# Patient Record
Sex: FEMALE | Race: WHITE | Employment: OTHER | URBAN - METROPOLITAN AREA
[De-identification: names, ages, dates, MRNs, and addresses within clinical notes are randomized per-mention and may not be internally consistent; named-entity substitution may affect disease eponyms.]

---

## 2019-03-08 PROBLEM — N18.30 CKD (CHRONIC KIDNEY DISEASE) STAGE 3, GFR 30-59 ML/MIN (HCC): Status: ACTIVE | Noted: 2019-03-08

## 2019-09-13 PROBLEM — N18.30 CKD (CHRONIC KIDNEY DISEASE) STAGE 3, GFR 30-59 ML/MIN (HCC): Status: RESOLVED | Noted: 2019-03-08 | Resolved: 2019-09-13

## 2019-09-13 PROBLEM — N18.4 CKD (CHRONIC KIDNEY DISEASE) STAGE 4, GFR 15-29 ML/MIN (HCC): Status: ACTIVE | Noted: 2019-09-13

## 2021-01-08 PROBLEM — D69.6 THROMBOCYTOPENIA (HCC): Status: ACTIVE | Noted: 2021-01-08

## 2023-04-13 RX ORDER — SIMVASTATIN 20 MG
20 TABLET ORAL NIGHTLY
COMMUNITY

## 2023-04-13 RX ORDER — SENNA PLUS 8.6 MG/1
2 TABLET ORAL NIGHTLY
COMMUNITY

## 2023-04-13 RX ORDER — ACETAMINOPHEN 500 MG
1000 TABLET ORAL
COMMUNITY

## 2023-04-28 ENCOUNTER — ANESTHESIA EVENT (OUTPATIENT)
Dept: SURGERY | Facility: HOSPITAL | Age: 83
End: 2023-04-28
Payer: MEDICARE

## 2023-04-28 ENCOUNTER — ANESTHESIA (OUTPATIENT)
Dept: SURGERY | Facility: HOSPITAL | Age: 83
End: 2023-04-28
Payer: MEDICARE

## 2023-04-28 ENCOUNTER — HOSPITAL ENCOUNTER (OUTPATIENT)
Facility: HOSPITAL | Age: 83
Discharge: NURSING, REHAB OR LONG-TERM CARE | End: 2023-05-01
Attending: ORTHOPAEDIC SURGERY | Admitting: ORTHOPAEDIC SURGERY
Payer: MEDICARE

## 2023-04-28 ENCOUNTER — APPOINTMENT (OUTPATIENT)
Dept: GENERAL RADIOLOGY | Facility: HOSPITAL | Age: 83
End: 2023-04-28
Attending: ORTHOPAEDIC SURGERY
Payer: MEDICARE

## 2023-04-28 PROCEDURE — 0QPG04Z REMOVAL OF INTERNAL FIXATION DEVICE FROM RIGHT TIBIA, OPEN APPROACH: ICD-10-PCS | Performed by: ORTHOPAEDIC SURGERY

## 2023-04-28 PROCEDURE — 0SGC0KZ FUSION OF RIGHT KNEE JOINT WITH NONAUTOLOGOUS TISSUE SUBSTITUTE, OPEN APPROACH: ICD-10-PCS | Performed by: ORTHOPAEDIC SURGERY

## 2023-04-28 PROCEDURE — 76000 FLUOROSCOPY <1 HR PHYS/QHP: CPT | Performed by: ORTHOPAEDIC SURGERY

## 2023-04-28 PROCEDURE — 0L8S3ZZ DIVISION OF RIGHT ANKLE TENDON, PERCUTANEOUS APPROACH: ICD-10-PCS | Performed by: ORTHOPAEDIC SURGERY

## 2023-04-28 PROCEDURE — 0SSF04Z REPOSITION RIGHT ANKLE JOINT WITH INTERNAL FIXATION DEVICE, OPEN APPROACH: ICD-10-PCS | Performed by: ORTHOPAEDIC SURGERY

## 2023-04-28 PROCEDURE — 0QPJ04Z REMOVAL OF INTERNAL FIXATION DEVICE FROM RIGHT FIBULA, OPEN APPROACH: ICD-10-PCS | Performed by: ORTHOPAEDIC SURGERY

## 2023-04-28 PROCEDURE — 76942 ECHO GUIDE FOR BIOPSY: CPT | Performed by: STUDENT IN AN ORGANIZED HEALTH CARE EDUCATION/TRAINING PROGRAM

## 2023-04-28 DEVICE — DEMINERALIZED BONE MATRIX (DBM) IN A LIPID CARRIER
Type: IMPLANTABLE DEVICE | Site: ANKLE | Status: FUNCTIONAL
Brand: STAGRAFT DBM PUTTY

## 2023-04-28 DEVICE — PLATE LCP TUB 1/3 5H 241.351: Type: IMPLANTABLE DEVICE | Site: ANKLE | Status: FUNCTIONAL

## 2023-04-28 DEVICE — IMPLANTABLE DEVICE: Type: IMPLANTABLE DEVICE | Site: ANKLE | Status: FUNCTIONAL

## 2023-04-28 DEVICE — SCREW BN 4MM 6MM 50MM LCP SS: Type: IMPLANTABLE DEVICE | Site: ANKLE | Status: FUNCTIONAL

## 2023-04-28 DEVICE — CANCELLOUS CHIPS 1-4MM 15CC: Type: IMPLANTABLE DEVICE | Site: ANKLE | Status: FUNCTIONAL

## 2023-04-28 RX ORDER — LISINOPRIL 20 MG/1
20 TABLET ORAL 2 TIMES DAILY
COMMUNITY

## 2023-04-28 RX ORDER — LABETALOL HYDROCHLORIDE 5 MG/ML
5 INJECTION, SOLUTION INTRAVENOUS EVERY 5 MIN PRN
Status: DISCONTINUED | OUTPATIENT
Start: 2023-04-28 | End: 2023-04-28 | Stop reason: HOSPADM

## 2023-04-28 RX ORDER — SODIUM CHLORIDE, SODIUM LACTATE, POTASSIUM CHLORIDE, CALCIUM CHLORIDE 600; 310; 30; 20 MG/100ML; MG/100ML; MG/100ML; MG/100ML
INJECTION, SOLUTION INTRAVENOUS CONTINUOUS
Status: DISCONTINUED | OUTPATIENT
Start: 2023-04-28 | End: 2023-04-28 | Stop reason: HOSPADM

## 2023-04-28 RX ORDER — CETIRIZINE HYDROCHLORIDE 5 MG/1
5 TABLET ORAL EVERY EVENING
Status: DISCONTINUED | OUTPATIENT
Start: 2023-04-28 | End: 2023-05-01

## 2023-04-28 RX ORDER — GLYCOPYRROLATE 0.2 MG/ML
INJECTION, SOLUTION INTRAMUSCULAR; INTRAVENOUS AS NEEDED
Status: DISCONTINUED | OUTPATIENT
Start: 2023-04-28 | End: 2023-04-28 | Stop reason: SURG

## 2023-04-28 RX ORDER — PHENYLEPHRINE HCL 10 MG/ML
VIAL (ML) INJECTION AS NEEDED
Status: DISCONTINUED | OUTPATIENT
Start: 2023-04-28 | End: 2023-04-28 | Stop reason: SURG

## 2023-04-28 RX ORDER — ENOXAPARIN SODIUM 100 MG/ML
30 INJECTION SUBCUTANEOUS NIGHTLY
Status: DISCONTINUED | OUTPATIENT
Start: 2023-04-28 | End: 2023-05-01

## 2023-04-28 RX ORDER — BISACODYL 10 MG
10 SUPPOSITORY, RECTAL RECTAL
Status: DISCONTINUED | OUTPATIENT
Start: 2023-04-28 | End: 2023-05-01

## 2023-04-28 RX ORDER — HYDROMORPHONE HYDROCHLORIDE 1 MG/ML
0.4 INJECTION, SOLUTION INTRAMUSCULAR; INTRAVENOUS; SUBCUTANEOUS EVERY 2 HOUR PRN
Status: DISCONTINUED | OUTPATIENT
Start: 2023-04-28 | End: 2023-05-01

## 2023-04-28 RX ORDER — HYDROCODONE BITARTRATE AND ACETAMINOPHEN 5; 325 MG/1; MG/1
1 TABLET ORAL ONCE AS NEEDED
Status: DISCONTINUED | OUTPATIENT
Start: 2023-04-28 | End: 2023-04-28 | Stop reason: HOSPADM

## 2023-04-28 RX ORDER — ACETAMINOPHEN 500 MG
1000 TABLET ORAL ONCE AS NEEDED
Status: DISCONTINUED | OUTPATIENT
Start: 2023-04-28 | End: 2023-04-28 | Stop reason: HOSPADM

## 2023-04-28 RX ORDER — METOCLOPRAMIDE HYDROCHLORIDE 5 MG/ML
10 INJECTION INTRAMUSCULAR; INTRAVENOUS EVERY 8 HOURS PRN
Status: DISCONTINUED | OUTPATIENT
Start: 2023-04-28 | End: 2023-05-01

## 2023-04-28 RX ORDER — ONDANSETRON 2 MG/ML
4 INJECTION INTRAMUSCULAR; INTRAVENOUS EVERY 6 HOURS PRN
Status: DISCONTINUED | OUTPATIENT
Start: 2023-04-28 | End: 2023-05-01

## 2023-04-28 RX ORDER — FAMOTIDINE 20 MG/1
20 TABLET, FILM COATED ORAL NIGHTLY
Status: DISCONTINUED | OUTPATIENT
Start: 2023-04-28 | End: 2023-05-01

## 2023-04-28 RX ORDER — FAMOTIDINE 20 MG/1
20 TABLET, FILM COATED ORAL EVERY 24 HOURS
Status: DISCONTINUED | OUTPATIENT
Start: 2023-04-28 | End: 2023-04-28

## 2023-04-28 RX ORDER — ROPIVACAINE HYDROCHLORIDE 5 MG/ML
INJECTION, SOLUTION EPIDURAL; INFILTRATION; PERINEURAL AS NEEDED
Status: DISCONTINUED | OUTPATIENT
Start: 2023-04-28 | End: 2023-04-28 | Stop reason: SURG

## 2023-04-28 RX ORDER — ALLOPURINOL 100 MG/1
100 TABLET ORAL DAILY
COMMUNITY

## 2023-04-28 RX ORDER — HYDROMORPHONE HYDROCHLORIDE 1 MG/ML
0.2 INJECTION, SOLUTION INTRAMUSCULAR; INTRAVENOUS; SUBCUTANEOUS EVERY 5 MIN PRN
Status: DISCONTINUED | OUTPATIENT
Start: 2023-04-28 | End: 2023-04-28 | Stop reason: HOSPADM

## 2023-04-28 RX ORDER — LIDOCAINE HYDROCHLORIDE 10 MG/ML
INJECTION, SOLUTION EPIDURAL; INFILTRATION; INTRACAUDAL; PERINEURAL AS NEEDED
Status: DISCONTINUED | OUTPATIENT
Start: 2023-04-28 | End: 2023-04-28 | Stop reason: SURG

## 2023-04-28 RX ORDER — ATORVASTATIN CALCIUM 10 MG/1
10 TABLET, FILM COATED ORAL NIGHTLY
Status: DISCONTINUED | OUTPATIENT
Start: 2023-04-28 | End: 2023-05-01

## 2023-04-28 RX ORDER — HYDROMORPHONE HYDROCHLORIDE 1 MG/ML
0.4 INJECTION, SOLUTION INTRAMUSCULAR; INTRAVENOUS; SUBCUTANEOUS EVERY 5 MIN PRN
Status: DISCONTINUED | OUTPATIENT
Start: 2023-04-28 | End: 2023-04-28 | Stop reason: HOSPADM

## 2023-04-28 RX ORDER — OXYCODONE HYDROCHLORIDE 5 MG/1
2.5 TABLET ORAL EVERY 4 HOURS PRN
Status: DISCONTINUED | OUTPATIENT
Start: 2023-04-28 | End: 2023-05-01

## 2023-04-28 RX ORDER — LISINOPRIL 20 MG/1
20 TABLET ORAL 2 TIMES DAILY
Status: DISCONTINUED | OUTPATIENT
Start: 2023-04-28 | End: 2023-05-01

## 2023-04-28 RX ORDER — OXYCODONE HYDROCHLORIDE 5 MG/1
5 TABLET ORAL EVERY 4 HOURS PRN
Status: DISCONTINUED | OUTPATIENT
Start: 2023-04-28 | End: 2023-05-01

## 2023-04-28 RX ORDER — SODIUM CHLORIDE, SODIUM LACTATE, POTASSIUM CHLORIDE, CALCIUM CHLORIDE 600; 310; 30; 20 MG/100ML; MG/100ML; MG/100ML; MG/100ML
INJECTION, SOLUTION INTRAVENOUS CONTINUOUS
Status: DISCONTINUED | OUTPATIENT
Start: 2023-04-28 | End: 2023-05-01

## 2023-04-28 RX ORDER — FEXOFENADINE HYDROCHLORIDE 60 MG/1
60 TABLET, FILM COATED ORAL DAILY PRN
COMMUNITY
Start: 2023-02-24

## 2023-04-28 RX ORDER — ALLOPURINOL 100 MG/1
100 TABLET ORAL DAILY
Status: DISCONTINUED | OUTPATIENT
Start: 2023-04-29 | End: 2023-05-01

## 2023-04-28 RX ORDER — DOCUSATE SODIUM 100 MG/1
100 CAPSULE, LIQUID FILLED ORAL 2 TIMES DAILY
Status: DISCONTINUED | OUTPATIENT
Start: 2023-04-28 | End: 2023-05-01

## 2023-04-28 RX ORDER — HYDROMORPHONE HYDROCHLORIDE 1 MG/ML
0.2 INJECTION, SOLUTION INTRAMUSCULAR; INTRAVENOUS; SUBCUTANEOUS EVERY 2 HOUR PRN
Status: DISCONTINUED | OUTPATIENT
Start: 2023-04-28 | End: 2023-05-01

## 2023-04-28 RX ORDER — ACETAMINOPHEN 500 MG
1000 TABLET ORAL ONCE
Status: DISCONTINUED | OUTPATIENT
Start: 2023-04-28 | End: 2023-04-28 | Stop reason: HOSPADM

## 2023-04-28 RX ORDER — POLYETHYLENE GLYCOL 3350 17 G/17G
17 POWDER, FOR SOLUTION ORAL DAILY PRN
Status: DISCONTINUED | OUTPATIENT
Start: 2023-04-28 | End: 2023-05-01

## 2023-04-28 RX ORDER — DEXAMETHASONE SODIUM PHOSPHATE 4 MG/ML
VIAL (ML) INJECTION AS NEEDED
Status: DISCONTINUED | OUTPATIENT
Start: 2023-04-28 | End: 2023-04-28 | Stop reason: SURG

## 2023-04-28 RX ORDER — NALOXONE HYDROCHLORIDE 0.4 MG/ML
80 INJECTION, SOLUTION INTRAMUSCULAR; INTRAVENOUS; SUBCUTANEOUS AS NEEDED
Status: DISCONTINUED | OUTPATIENT
Start: 2023-04-28 | End: 2023-04-28 | Stop reason: HOSPADM

## 2023-04-28 RX ORDER — FAMOTIDINE 10 MG/ML
20 INJECTION, SOLUTION INTRAVENOUS EVERY 24 HOURS
Status: DISCONTINUED | OUTPATIENT
Start: 2023-04-28 | End: 2023-04-28

## 2023-04-28 RX ORDER — SODIUM PHOSPHATE, DIBASIC AND SODIUM PHOSPHATE, MONOBASIC 7; 19 G/133ML; G/133ML
1 ENEMA RECTAL ONCE AS NEEDED
Status: DISCONTINUED | OUTPATIENT
Start: 2023-04-28 | End: 2023-05-01

## 2023-04-28 RX ORDER — LABETALOL HYDROCHLORIDE 5 MG/ML
INJECTION, SOLUTION INTRAVENOUS AS NEEDED
Status: DISCONTINUED | OUTPATIENT
Start: 2023-04-28 | End: 2023-04-28 | Stop reason: SURG

## 2023-04-28 RX ORDER — FAMOTIDINE 20 MG/1
20 TABLET, FILM COATED ORAL NIGHTLY
COMMUNITY

## 2023-04-28 RX ORDER — HYDROMORPHONE HYDROCHLORIDE 1 MG/ML
0.6 INJECTION, SOLUTION INTRAMUSCULAR; INTRAVENOUS; SUBCUTANEOUS EVERY 5 MIN PRN
Status: DISCONTINUED | OUTPATIENT
Start: 2023-04-28 | End: 2023-04-28 | Stop reason: HOSPADM

## 2023-04-28 RX ORDER — HYDROCODONE BITARTRATE AND ACETAMINOPHEN 5; 325 MG/1; MG/1
2 TABLET ORAL ONCE AS NEEDED
Status: DISCONTINUED | OUTPATIENT
Start: 2023-04-28 | End: 2023-04-28 | Stop reason: HOSPADM

## 2023-04-28 RX ORDER — ATENOLOL 25 MG/1
25 TABLET ORAL 2 TIMES DAILY
COMMUNITY

## 2023-04-28 RX ORDER — ONDANSETRON 2 MG/ML
INJECTION INTRAMUSCULAR; INTRAVENOUS AS NEEDED
Status: DISCONTINUED | OUTPATIENT
Start: 2023-04-28 | End: 2023-04-28 | Stop reason: SURG

## 2023-04-28 RX ADMIN — SODIUM CHLORIDE, SODIUM LACTATE, POTASSIUM CHLORIDE, CALCIUM CHLORIDE: 600; 310; 30; 20 INJECTION, SOLUTION INTRAVENOUS at 14:32:00

## 2023-04-28 RX ADMIN — LIDOCAINE HYDROCHLORIDE 50 MG: 10 INJECTION, SOLUTION EPIDURAL; INFILTRATION; INTRACAUDAL; PERINEURAL at 11:50:00

## 2023-04-28 RX ADMIN — LABETALOL HYDROCHLORIDE 5 MG: 5 INJECTION, SOLUTION INTRAVENOUS at 13:40:00

## 2023-04-28 RX ADMIN — LABETALOL HYDROCHLORIDE 5 MG: 5 INJECTION, SOLUTION INTRAVENOUS at 13:50:00

## 2023-04-28 RX ADMIN — ROPIVACAINE HYDROCHLORIDE 25 ML: 5 INJECTION, SOLUTION EPIDURAL; INFILTRATION; PERINEURAL at 11:55:00

## 2023-04-28 RX ADMIN — LABETALOL HYDROCHLORIDE 5 MG: 5 INJECTION, SOLUTION INTRAVENOUS at 14:00:00

## 2023-04-28 RX ADMIN — PHENYLEPHRINE HCL 100 MCG: 10 MG/ML VIAL (ML) INJECTION at 11:50:00

## 2023-04-28 RX ADMIN — GLYCOPYRROLATE 0.2 MG: 0.2 INJECTION, SOLUTION INTRAMUSCULAR; INTRAVENOUS at 12:45:00

## 2023-04-28 RX ADMIN — ONDANSETRON 4 MG: 2 INJECTION INTRAMUSCULAR; INTRAVENOUS at 13:45:00

## 2023-04-28 RX ADMIN — LABETALOL HYDROCHLORIDE 5 MG: 5 INJECTION, SOLUTION INTRAVENOUS at 13:30:00

## 2023-04-28 RX ADMIN — DEXAMETHASONE SODIUM PHOSPHATE 4 MG: 4 MG/ML VIAL (ML) INJECTION at 11:50:00

## 2023-04-28 RX ADMIN — LABETALOL HYDROCHLORIDE 5 MG: 5 INJECTION, SOLUTION INTRAVENOUS at 13:35:00

## 2023-04-28 RX ADMIN — SODIUM CHLORIDE, SODIUM LACTATE, POTASSIUM CHLORIDE, CALCIUM CHLORIDE: 600; 310; 30; 20 INJECTION, SOLUTION INTRAVENOUS at 11:40:00

## 2023-04-28 NOTE — ANESTHESIA PROCEDURE NOTES
Airway  Date/Time: 4/28/2023 11:51 AM  Urgency: elective    Airway not difficult    General Information and Staff    Patient location during procedure: OR  Anesthesiologist: Narcisa Walsh MD  Performed: anesthesiologist   Performed by: Narcisa Walsh MD  Authorized by:  Narcisa Walsh MD      Indications and Patient Condition  Indications for airway management: anesthesia  Sedation level: deep  Preoxygenated: yes  Patient position: sniffing  Mask difficulty assessment: 0 - not attempted    Final Airway Details  Final airway type: supraglottic airway      Successful airway: classic  Size 3       Number of attempts at approach: 1  Number of other approaches attempted: 0

## 2023-04-28 NOTE — PROGRESS NOTES
[facility] Pharmacy Note:  Renal Dose Adjustment for Cetirizine (ZYRTEC)    Yessy Pepper has been prescribed Cetirizine (Zyrtec) 10 mg orally daily. CrCl cannot be calculated (Patient's most recent lab result is older than the maximum 7 days allowed. ). SCr from 4/18/23 = 1.61. Estmated ClCr = 29 mL/min. The ClCr <32 so the dose has been changed to 5 mg orally daily per P&T approved protocol. Pharmacy will follow and resume original order if renal function improves.       Thank you,  [address]  4/28/2023  5:25 PM  [address] [facility]: [phone]

## 2023-04-28 NOTE — BRIEF OP NOTE
Pre-Operative Diagnosis: CLOSED RIGHT TRIMALLEOLAR FRACTURE, WITH MALUNION, SUBSEQUENT ENCOUNTER; ANKLE SYNDESMOSIS DISRUPTION, RIGHT, SEQUELA; CLOSED DISCLOCATION OF RIGHT ANKLE, SEQUELA     Post-Operative Diagnosis: CLOSED RIGHT TRIMALLEOLAR FRACTURE, WITH MALUNION, SUBSEQUENT ENCOUNTER; ANKLE SYNDESMOSIS DISRUPTION, RIGHT, SEQUELA; CLOSED DISCLOCATION OF RIGHT ANKLE, SEQUELA      Procedure Performed:   RIGHT OPEN REPAIR ANKLE DISLOCATION, TIBIOFIBULAR ARTHRODESIS WITH ALLOGRAFT, REMOVAL OF HARDWARE AND RIGHT ACHILLES TENDON LENGTHENING    Surgeon(s) and Role:     * Dina Reese MD - Primary    Assistant(s):  PA: Isidra Caballero PA-C     Surgical Findings: Ankle dislocation Syndesmosis instability, Achilles contracture     Specimen: NONE     Estimated Blood Loss: Blood Output: 50 mL (4/28/2023  1:57 PM)      Dictation Number:  TBD    Abner Ramos MD  4/28/2023  2:19 PM

## 2023-04-28 NOTE — INTERVAL H&P NOTE
Pre-op Diagnosis: CLOSED RIGHT TRIMALLEOLAR FRACTURE, WITH MALUNION, SUBSEQUENT ENCOUNTER; ANKLE SYNDESMOSIS DISRUPTION, RIGHT, SEQUELA; CLOSED DISCLOCATION OF RIGHT ANKLE, SEQUELA    The above referenced H&P was reviewed by Marsha Gonzalez MD on 4/28/2023, the patient was examined and no significant changes have occurred in the patient's condition since the H&P was performed. I discussed with the patient and/or legal representative the potential benefits, risks and side effects of this procedure; the likelihood of the patient achieving goals; and potential problems that might occur during recuperation. I discussed reasonable alternatives to the procedure, including risks, benefits and side effects related to the alternatives and risks related to not receiving this procedure. We will proceed with procedure as planned.

## 2023-04-28 NOTE — ANESTHESIA PROCEDURE NOTES
Regional Block    Date/Time: 4/28/2023 11:51 AM    Performed by: Keegan Saab MD  Authorized by: Keegan Saab MD      General Information and Staff    Start Time:  4/28/2023 11:51 AM  End Time:  4/28/2023 11:55 AM  Anesthesiologist:  Keegan Saab MD  Performed by: Anesthesiologist  Patient Location:  OR    Block Placement: Post Induction  Site Identification: real time ultrasound guided and image stored and retrievable    Block site/laterality marked before start: site marked  Reason for Block: at surgeon's request and post-op pain management    Preanesthetic Checklist: 2 patient identifers, IV checked, site marked, risks and benefits discussed, monitors and equipment checked, pre-op evaluation, timeout performed, anesthesia consent, sterile technique used, no prohibitive neurological deficits and no local skin infection at insertion site      Procedure Details    Patient Position:  Supine  Prep: ChloraPrep    Monitoring:  Cardiac monitor, continuous pulse ox and blood pressure cuff  Block Type:  Popliteal and saphenous  Laterality:  Right  Injection Technique:  Single-shot    Needle    Needle Type:  Short-bevel and echogenic  Needle Gauge:  21 G  Needle Length:  100 mm  Needle Localization:  Ultrasound guidance  Reason for Ultrasound Use: appropriate spread of the medication was noted in real time and no ultrasound evidence of intravascular and/or intraneural injection            Assessment    Injection Assessment:  Good spread noted, negative resistance, negative aspiration for heme, incremental injection, low pressure, local visualized surrounding nerve on ultrasound and no pain on injection  Paresthesia Pain:  None  Heart Rate Change: No    - Patient tolerated block procedure well without evidence of immediate block related complications.      Medications  4/28/2023 11:51 AM      Additional Comments    Medication:  Ropivacaine 0.5% 15 mL for popliteal block, 10 mL for saphenous block

## 2023-04-28 NOTE — PROGRESS NOTES
[facility] Pharmacy Note:  Renal Dose Adjustment    Hanane Moreno has been prescribed famotidine (PEPCID) 20 mg intravenously and orally every 12 hours. CrCl cannot be calculated (Patient's most recent lab result is older than the maximum 7 days allowed. ). SCr from 4/18/23 = 1.61. Estimated ClCr = 29 mL/min    Calculated creatinine clearance is < 50 ml/min, therefore the dose of famotidine (Pepcid) has been changed to 20 mg intravenously and orally every 24 hours per P&T approved protocol. Pharmacy will continue to follow, and if renal function improves, will resume the original order.     Thank you,  [address] [place] [address]  4/28/2023 4:17 PM

## 2023-04-29 LAB
HCT VFR BLD AUTO: 29.4 %
HGB BLD-MCNC: 9.8 G/DL

## 2023-04-29 PROCEDURE — 85018 HEMOGLOBIN: CPT | Performed by: SPECIALIST/TECHNOLOGIST

## 2023-04-29 PROCEDURE — 85014 HEMATOCRIT: CPT | Performed by: SPECIALIST/TECHNOLOGIST

## 2023-04-29 PROCEDURE — 97530 THERAPEUTIC ACTIVITIES: CPT

## 2023-04-29 PROCEDURE — 97161 PT EVAL LOW COMPLEX 20 MIN: CPT

## 2023-04-29 PROCEDURE — 97165 OT EVAL LOW COMPLEX 30 MIN: CPT

## 2023-04-29 RX ORDER — TIZANIDINE 2 MG/1
2 TABLET ORAL NIGHTLY PRN
Status: DISCONTINUED | OUTPATIENT
Start: 2023-04-29 | End: 2023-05-01

## 2023-04-29 NOTE — PLAN OF CARE
Post-op day 1. Dressing is clean, dry, and intact. Non-weightbearing to right lower extremity with extremity elevated. Alert and oriented x4. Room air. Incentive spirometer compliance. PT/OT evaluations completed today. Up with min assist, walker and bedside commode. Plan is for [address] [place] [address] when medically stable.

## 2023-04-29 NOTE — PLAN OF CARE
A&O x4. VSS on RA. . Denies pain, numbness still present to RLE. NWB RLE. RLE elevated overnight, ice packs applied. Straight cath per protocol x1 overnight. Pt voiding small amounts frequently this AM per maura. LLE SCDs. RLE dressing C/D/I. IV abx post op. Reviewed POC, pain management, IS use, and fall precautions with pt. Bed alarm on w/bed in lowest position. Pt reminded to use call light. Verbalized understanding. PT to see.

## 2023-04-29 NOTE — CM/SW NOTE
SW informed by RN pt had made prearrangements with  [facility] for [facility]. PT/OT also recommending ALAN. OLESYA spoke to [employer] ([phone]) from [address] who confirmed prearrangements, and asked for referral to be sent in [address] [place] [address]. SW sent referral notes, PASRR complete. SW to remain available for dc planning needs.        Heather Carlson, [place]  Discharge [address]

## 2023-04-29 NOTE — OPERATIVE REPORT
[facility]    PATIENT'S NAME: Alba JOHNSON   ATTENDING PHYSICIAN: Morgan Marlow. Prince Colindres MD   OPERATING PHYSICIAN: Morgan Marlow. Prince Colindres MD   PATIENT ACCOUNT#:   [id number]    LOCATION:  [address] [place] [address]  MEDICAL RECORD #:   [id number]       YOB: 1940  ADMISSION DATE:       04/28/2023      OPERATION DATE:  04/28/2023    OPERATIVE REPORT      PREOPERATIVE DIAGNOSIS:    1. Chronic right ankle dislocation. 2.   Chronic syndesmotic disruption, right. 3.   Malunion of trimalleolar ankle fracture, right. POSTOPERATIVE DIAGNOSIS:    1. Chronic right ankle dislocation. 2.   Chronic syndesmotic disruption, right. 3.   Malunion of trimalleolar ankle fracture, right. 4.   Achilles tendon contracture, right. PROCEDURE:    1. Open treatment, ankle dislocation with internal fixation, right (CPT code 55192). 2.   Arthrodesis, distal tibiofibular joint, right (CPT code 15058-14 modifier). 3.   Removal of implants, deep, right ankle (CPT code 52869-34 modifier). 4.   Percutaneous tenotomy, Achilles tendon, right (CPT code 98188-26 modifier). ASSISTANT:  Jammie Samuel PA-C. Please note a skilled and experienced assistant was necessary for the safe and effective performance of the operation. The assistant participated in positioning, prepping and draping, as well as retraction to assist with exposure and protection of vital structures during the surgical procedures. The assistant also participated in wound closure, dressing application, including splint application. TOURNIQUET TIME:   100 minutes. COMPLICATIONS:  None apparent. ANESTHESIA:  General plus regional.  ESTIMATED BLOOD LOSS:  50 mL. INTRAVENOUS FLUIDS:  1 L.    INDICATIONS:  The patient sustained a trimalleolar ankle fracture that was treated with over internal fixation approximately 1 year ago.   She went on to malunion of her fracture and she had chronic syndesmotic diastasis/instability for which she then went on to wrap it, posttraumatic arthritis of the ankle as well as a chronic ankle dislocation posterolaterally. She was indicated for the aforementioned procedures for pain relief and functional improvement. An opportunity for all her questions to be answered was provided and once that was completed, she freely consented to proceed with the operation. FINDINGS:  There was an Achilles tendon contracture that required a 3 step-cut hemisection to provide and restore full range of motion of the ankle joint. There was obvious chronic disruption of the syndesmotic ligament complex and a posterior lateral ankle dislocation. I was able to achieve an anatomic reduction of the ankle joint and aligned and stabilized the syndesmosis by an arthrodesis procedure. The ankle was stabilized with fixation as well. OPERATIVE TECHNIQUE:  The patient was identified, surgical site marked, and paperwork updated. She was brought into the operating room and placed supine on the operative table. A general and a regional anesthetic were administered with a block placed in the right lower extremity. A prep and drape was performed after a tourniquet had been applied to her right proximal thigh under padding. The leg was elevated to exsanguinate it and then the tourniquet was inflated to 300 mmHg. After the time-out was taken, paused and confirmed by all members of the surgical team.    Attention turned to her previous posterior lateral incision. This was re-incised over its entire length and distally it had to be extended in a curvilinear fashion curving anteriorly. This was elevated through the skin and subcutaneous tissues, and there was little coverage over the plate. I exposed the previously placed fibular plate, exposing it in its entirety and used a small fragment screwdriver to remove all the screws and the 6-hole plate.     At this time, I made a separate incision in the anteromedial aspect of the ankle just lateral to the lateral border of the anterior tibial tendon using her previous surgical incision. I carried down through skin and subcutaneous tissues, incising the superior extensor retinaculum, mobilizing the interval between the tibialis anterior and the EHL. I carefully dissected down, elevating the periosteum to expose the screw head. Here, I used the screwdriver to remove the 4.5 cannulated screw. This wound was irrigated and closed in layers, repairing the extensor retinaculum, subcutaneous layer and skin. Now my attention turned back to the lateral aspect of the ankle. She had a posterior lateral ankle dislocation and the syndesmosis was obviously disrupted. It was highly unstable and could be posteriorly translated and widened. I now began excising the dense scar tissue that was tethering the fibula in a posterior lateral position. I dissected over the anterior cortex of the fibula and into the syndesmotic space and had to release the anterior tibiofibular scar remnant as well as dissecting around the tip of the fibula distally. I dissected posteriorly in order to mobilize this as well and incised part of this peroneal retinaculum in order to be able to have more ability to translate the fibula anteriorly. I then went in and released the contracted scar to posterior tibiofibular ligament in the depths of the wound. Now, at this time, I prepared the tibiofibular articulation for fusion. I decorticated the tibial incisura getting back to healthy cancellous bone. I also had to osteotomize the malunited posterior malleolus fracture in order to regain the proper contour of the incisura and also to help facilitate reduction of the fibula. I got back to healthy cancellous bone surfaces over the entire tibial incisura and in a similar fashion, I prepared the medial cortex of the fibula.   I decorticated using osteotomes and then curetted it back to healthy cancellous bone over a length of about 3 to 4 cm from the level of the joint line proximally. Now, I assessed the ankle range of motion. When I reduced the ankle re-positioning talus anterior and medial so it was properly aligned, there was inability to dorsiflex past neutral and there was resistance and causing the talus to re-subluxate. Thus, at this time, I performed a lengthening of the Achilles tendon using the [place] triple hemisection technique. I palpated the contour of the Achilles tendon distally and marked it out over a length of about 5 to 6 cm. I made a medial hemisection distally approximately 1 fingerbreadth above the insertion of the Achilles onto the calcaneus. Then, 3 cm above this, I made a lateral hemisection of the Achilles tendon through a percutaneous incision and then 3 cm proximal to this, I made another medial hemisection through the proximal aspect of the Achilles tendon. With gentle dorsiflexion, I achieved full range of motion of the ankle joint having now alleviated the Achilles tendon contracture. This also made it easier for me to properly position the talus in the ankle mortise. At this point, I now prepared my bone graft on the back table using 15 mL of pulverized tricortical cancellous bone graft mixing it with 5 mL of demineralized bone matrix. I placed this allograft into the space between the tibial incisura and the fibula. This would effect my arthrodesis of the tibiofibular articulation with bridging bone. Now, I reduced the ankle again, making sure that the talus was concentrically reduced on the AP and mortise view as well as checking a lateral view. In this position, I pinned this reduction of the ankle dislocation. I bone grafted previously placed in the area of the syndesmosis filled the space nicely and fully, so I could now go ahead and select a 5-hole 1/3 semitubular plate. I placed it so that I would be able to get new screw holes instead of using the previous screw holes from the previous fixation. With this reduction pin and the plate properly contoured to the bone, I placed three 4.0 cortical lag screws through the plate laterally and into the distal tibia. All 3 screws had excellent purchase and maintained my reduction of the ankle dislocation. I now removed the K-wire and placed a 3.5 cortical screw in the proximal-most hole just to stabilize the plate against the bone here. Now, fluoroscopic images demonstrated the ankle to be concentrically reduced, the fibula to be properly reduced in the tibial incisura. On the lateral view, there was a concentric reduction of the talus against the tibial plafond and I was able to put the ankle through a range of motion past neutral.    I placed further bone graft to help facilitate fusion on the anterior aspect of the syndesmotic region and then closed in layers. This was done using a 2-0 Vicryl to close the deep periosteal layer interrupted 3-0 and then nylon placed on the skin. At this time, the patient placed in a well-molded short-leg splint with the ankle in neutral position. She was brought to the recovery room having tolerated the procedure well. Of note, all sponge counts and needle counts were correct x2. DISPOSITION:  Patient will be admitted to the hospital overnight. She will receive postoperative antibiotics, postoperative pain medications and we will use Lovenox as her DVT prophylaxis. She will be discharged in the a.m. if she passes physical therapy and has adequate pain control on oral medications. If needed, she will go to a skilled nursing facility. Dictated By Bk Mendoza MD  d: 04/28/2023 14:30:18  t: 04/29/2023 00:02:57  [facility] [id number]  [facility]/

## 2023-04-30 LAB
ANION GAP SERPL CALC-SCNC: 5 MMOL/L (ref 0–18)
BUN BLD-MCNC: 32 MG/DL (ref 7–18)
CALCIUM BLD-MCNC: 8.6 MG/DL (ref 8.5–10.1)
CHLORIDE SERPL-SCNC: 113 MMOL/L (ref 98–112)
CO2 SERPL-SCNC: 25 MMOL/L (ref 21–32)
CREAT BLD-MCNC: 1.45 MG/DL
GFR SERPLBLD BASED ON 1.73 SQ M-ARVRAT: 36 ML/MIN/1.73M2 (ref 60–?)
GLUCOSE BLD-MCNC: 116 MG/DL (ref 70–99)
OSMOLALITY SERPL CALC.SUM OF ELEC: 304 MOSM/KG (ref 275–295)
POTASSIUM SERPL-SCNC: 4.3 MMOL/L (ref 3.5–5.1)
SODIUM SERPL-SCNC: 143 MMOL/L (ref 136–145)

## 2023-04-30 PROCEDURE — 80048 BASIC METABOLIC PNL TOTAL CA: CPT | Performed by: HOSPITALIST

## 2023-04-30 PROCEDURE — 97530 THERAPEUTIC ACTIVITIES: CPT

## 2023-04-30 RX ORDER — ASPIRIN 81 MG/1
81 TABLET, CHEWABLE ORAL DAILY
Status: DISCONTINUED | OUTPATIENT
Start: 2023-04-30 | End: 2023-05-01

## 2023-04-30 RX ORDER — MELATONIN
1000 DAILY
Status: DISCONTINUED | OUTPATIENT
Start: 2023-04-30 | End: 2023-05-01

## 2023-04-30 RX ORDER — ACETAMINOPHEN 500 MG
1000 TABLET ORAL EVERY 6 HOURS PRN
Status: DISCONTINUED | OUTPATIENT
Start: 2023-04-30 | End: 2023-05-01

## 2023-04-30 NOTE — PROGRESS NOTES
I called and spoke with the alysetient and her nurse. Current pain level is 3-4/10  - last pain med dose was 11 am    Plan is to stop opioid pain meds, use Tylenol 1000 mg Q6 PRN for pain 4/10 or higher.     ASA 81 mg PO daily and Vitamin B12 started as well

## 2023-04-30 NOTE — PLAN OF CARE
Post-op day 2. Dressing is clean, dry, and intact. Non-weightbearing to right lower extremity with extremity elevated. Alert and oriented x4. Room air. Incentive spirometer compliance. PT/OT. Up with min assist, walker and bedside commode. Constipation resolved with bowel movement during shift. Tylenol added for pain management every 6 hours PRN. Aspirin restarted. Plan is for [address] when medically stable.

## 2023-05-01 VITALS
DIASTOLIC BLOOD PRESSURE: 55 MMHG | HEART RATE: 62 BPM | TEMPERATURE: 98 F | RESPIRATION RATE: 18 BRPM | SYSTOLIC BLOOD PRESSURE: 122 MMHG | BODY MASS INDEX: 21.83 KG/M2 | WEIGHT: 131 LBS | HEIGHT: 65 IN | OXYGEN SATURATION: 97 %

## 2023-05-01 PROCEDURE — 97116 GAIT TRAINING THERAPY: CPT

## 2023-05-01 PROCEDURE — 97530 THERAPEUTIC ACTIVITIES: CPT

## 2023-05-01 NOTE — DISCHARGE SUMMARY
DISCHARGE SUMMARY  DATE of ADMISSION: 4/28/23  DATE of DISCHARGE: 5/1/23     PROCEDURE: right ankle reconstruction  DISCHARGE DIAGNOSIS: chronic right ankle dislocation s/p reconstruction    ADMITTED UNDER 23 HOUR OBSERVATION STATUS TO MONITOR MEDICALLY POST-OP AND CONVERTED TO ADMISSION FOR PAIN CONTROL AND MEDICAL MANAGEMENT  HOSPITALIST CONSULT EVALUATED AND FOLLOWED PATIENT THRU THE HOSPITALIZATION  LABS WNL ON POD 1  NO ACUTE EVENTS WHILE IN THE HOSPITAL   PT CONSULT FOR MOBILIZING THE PATIENT UNDER NWB STATUS - RIGHT LE  THE PATIENT CLEARED PT  AND WENT TO SNF  FOLLOW UP IN 2+ WEEKS WITH DR. Liliya Huerta  NWB WITH ELEVATION    D/C TO HOME ALONG POST OP PAIN MEDS AND RESUMPTION OF HOME MEDS

## 2023-05-01 NOTE — PLAN OF CARE
Alert and oriented x 4. Vitals stable on room air. Pain managed on PO medications. Dressings clean, dry, intact. Right lower extremity. elevated and  ice applied to the ankle. Voiding without difficulty via Purewick. Last BM 04/30. Tolerating regular diet. SCD's on left lower leg. Safety precautions in place. Plan for discharge to [address] [place] [address] when medically cleared. Plan of care discussed with patient.

## 2023-05-01 NOTE — DISCHARGE INSTRUCTIONS
-non-weight bearing to the right lower extremity  -up with 1 person min assist with walker for transfer to commode or chair  -physical therapy, occupational therapy to evaluate and treat  -up to chair for all meals  -keep  right leg elevated at all times, above level of heart if possible  -ice to the ankle as needed  -leave dressing clean, dry, intact  -continue breathing tool use    Regular diet            Postoperative Discharge Instructions for the Patient  Dr. Jesus Higgins MD, Orthopaedic Foot & Ankle Surgeon  For all questions, please call [phone]      Instructions for Postoperative Care    Please keep dressing clean and dry and intact. If you have a splint or cast, please keep it on and intact. Do not remove. Call if you have concerns or problems. Please lie down and elevate the limb you had surgery on above the level of your heart using pillows, blankets or foam wedges. Stay lying down and keep the limb elevated around the clock as soon as you get home. It should only be down below heart level to go the bathroom. Showers   No Showering for the first 48-72 hours after surgery, due to pain, swelling and concern for falls. After 48-72 hours, you may shower, but you must place your limb in a well-sealed plastic bag. Also, you must be able to tolerate having limb below heart level for the entire showering process. You should purchase a shower stool/chair so you can sit in the shower and bathe. Please take care to avoid slips or falls. Activity  Non-weight bearing (unless you have been instructed otherwise). Your foot must not touch the ground when you are standing, walking, showering, etc.  Use your crutches/walker/scooter as directed. ? Medications  Blood Clot Prevention: You have been instructed to use Aspirin or Lovenox injections once a day. You should begin the anticoagulation medicine the day after surgery.    Pain: Use only the pain medications you have been prescribed and follow the instructions given. While on Opioid pain medication, please DO NOT:  Drive  Operate Heavy [employer]inery/Power Tools  Drink any beverages containing alcohol    All Opioid pain medication causes some degree of itching, sedation, constipation and nausea. Drink plenty of water to remain hydrated helps with these effects. If you anticipate running out of pain medication before your next appointment, please call during office hours, Monday through Friday to discuss refills    Home Medications  You may resume all these mediations after surgery with the following exceptions:  Immune Modulating Drugs: Such as medications for rheumatoid arthritis, psoriasis and chemotherapy  Steroids: Such as medications for asthma, inflammatory conditions  Anticoagulation: This is different from the blood clot prevention medication.  These are your anticoagulant medications for pre-existing hematological conditions    PLEASE CHECK WITH DOCTOR/NURSE BEFORE SURGERY REGARDING WHEN TO RE-START THESE MEDICATIONS AFTER SURGERY    Please call [phone] if you have the following  Excessive swelling that doesn't improve with elevation  Foul smelling odor from your wounds or dressings  Pus discharging from your surgical wounds  Persistent severe pain that does not get better with the prescription pain medication & elevation  Persistent nausea and/or vomiting  Fevers greater than 101.5 after the first 48 hrs post op  Dramatic changes to your post-operative pain    If you experience any of the following, please immediately go to your nearest Emergency Room  Chest Pain  Shortness of Breath/ Difficulty Breathing  Uncontrolled bleeding at the site of surgery

## 2023-05-01 NOTE — CM/SW NOTE
Spoke with pt's RN who stated plan for DC to [facility] today. Contacted [facility] and received confirmation bed available. Facility requesting rapid COVID testing prior to discharge. Medicar transport scheduled for 1230pm.  PCS form completed and available for RN to print. Updated pt at bedside and informed her of costs for medicar transport. Pt agreeable with plan and costs. Updated pt's RN. / to remain available for support and/or discharge planning.      [address]  L:[phone]  [address]    Hermilo Colindres LCSW  Discharge Planner  [phone]

## 2023-05-01 NOTE — PROGRESS NOTES
Called report to Radu Jean-Baptiste RN. Patient transported via [address] Belongings packed and taken by the patient.

## 2023-05-01 NOTE — PLAN OF CARE
Alert and oriented x4. VSS. On RA. . IS encouraged. SCDs on LLE. Tolerating diet. Pain controlled with tylenol PRN. ACE wrap to RLE C/D/I. Ice applied and RLE elevated. Ambulating with min assist. Plan is for [facility] when medically stable. Call light within reach.

## 2023-05-03 ENCOUNTER — LAB REQUISITION (OUTPATIENT)
Dept: LAB | Age: 83
End: 2023-05-03

## 2023-05-03 DIAGNOSIS — Z13.9 ENCOUNTER FOR SCREENING, UNSPECIFIED: ICD-10-CM

## 2023-05-03 PROCEDURE — 86480 TB TEST CELL IMMUN MEASURE: CPT | Performed by: CLINICAL MEDICAL LABORATORY

## 2023-05-03 PROCEDURE — PSEU9049 QUANTIFERON TB PLUS: Performed by: CLINICAL MEDICAL LABORATORY

## 2023-05-05 LAB
GAMMA INTERFERON BACKGROUND BLD IA-ACNC: 0.04 IU/ML
M TB IFN-G BLD-IMP: NEGATIVE
M TB IFN-G CD4+ BCKGRND COR BLD-ACNC: 0.03 IU/ML
M TB IFN-G CD4+CD8+ BCKGRND COR BLD-ACNC: 0.03 IU/ML
MITOGEN IGNF BCKGRD COR BLD-ACNC: 4.08 IU/ML

## (undated) DEVICE — STERILE POLYISOPRENE POWDER-FREE SURGICAL GLOVES: Brand: PROTEXIS

## (undated) DEVICE — PADDING CAST COTTON STER 6

## (undated) DEVICE — CURAD OIL EMLUSION GAUZE 3X16

## (undated) DEVICE — PADDING CAST COTTON STER 4

## (undated) DEVICE — WIRE K SYNT 2.0 292.20
Type: IMPLANTABLE DEVICE | Site: ANKLE | Status: NON-FUNCTIONAL
Removed: 2023-04-28

## (undated) DEVICE — UNDYED BRAIDED (POLYGLACTIN 910), SYNTHETIC ABSORBABLE SUTURE: Brand: COATED VICRYL

## (undated) DEVICE — SLEEVE KENDALL SCD EXPRESS MED

## (undated) DEVICE — BIT DRL AU 180MM 2.5MM SS QC

## (undated) DEVICE — MEDI-VAC NON-CONDUCTIVE SUCTION TUBING: Brand: CARDINAL HEALTH

## (undated) DEVICE — LOWER EXTREMITY CDS-LF: Brand: MEDLINE INDUSTRIES, INC.

## (undated) DEVICE — STERILE POLYISOPRENE POWDER-FREE SURGICAL GLOVES WITH EMOLLIENT COATING: Brand: PROTEXIS

## (undated) DEVICE — DISPOSABLE TOURNIQUET CUFF SINGLE BLADDER, DUAL PORT AND QUICK CONNECT CONNECTOR: Brand: COLOR CUFF

## (undated) DEVICE — BIT DRL 160MM 4MM SS QC

## (undated) DEVICE — MEGADYNE ELECTRODE ADULT PT RT

## (undated) DEVICE — SOL NACL IRRIG 0.9% 1000ML BTL

## (undated) DEVICE — BNDG COHESIVE W4INXL5YD TAN E

## (undated) DEVICE — C-ARM: Brand: UNBRANDED

## (undated) DEVICE — SPLINT PLASTER 5

## (undated) DEVICE — C-ARMOR C-ARM EQUIPMENT COVERS CLEAR STERILE UNIVERSAL FIT 12 PER CASE: Brand: C-ARMOR

## (undated) DEVICE — SUT ETHILON 3-0 PS-1 1663H

## (undated) DEVICE — BIT DRL 150MM 2.9MM SS QC CNN

## (undated) DEVICE — SCREW CANC FT 4.0X16 206.016
Type: IMPLANTABLE DEVICE | Site: ANKLE | Status: NON-FUNCTIONAL
Removed: 2023-04-28

## (undated) NOTE — LETTER
OUTSIDE TESTING RESULT REQUEST     IMPORTANT: FOR YOUR IMMEDIATE ATTENTION  Please FAX all test results listed below to: [phone]     Testing already done on or about: 23    * * * * If testing is NOT complete, arrange with patient A.S.A.P. * * * *      Patient Name: Nicola JOHNSON  Surgery Date: 2023  Medical Record: [id number]  CSN: [id number]  : 1940 - A: 81 y     Sex: female  Surgeon(s):  Fanny Swanson MD  Procedure: RIGHT OPEN REPAIR ANKLE DISLOCATION, TIBIOFIBULAR ARTHRODESIS WITH ALLOGRAFT, REMOVAL OF HARDWARE AND POSSIBLE RIGHT TENDON LENGTHENING  Anesthesia Type: General     Surgeon: Fanny Swanson MD     The following Testing and Time Line are REQUIRED PER ANESTHESIA     CBC, Platelet; NO Differential within  30 days  BMP (requires 4 hour fast) within  60 days      Thank You,   Sent by:ONEL Avendaño

## (undated) NOTE — LETTER
OUTSIDE TESTING RESULT REQUEST     IMPORTANT: FOR YOUR IMMEDIATE ATTENTION  Please FAX all test results listed below to: [phone]     Testing already done on or about: 23     * * * * If testing is NOT complete, arrange with patient A.S.A.P. * * * *      Patient Name: Moises JOHNSON  Surgery Date: 2023  Medical Record: [id number]  CSN: [id number]  : 1940 - A: 81 y     Sex: female  Surgeon(s):  Alberto Salvador MD  Procedure: RIGHT OPEN REPAIR ANKLE DISLOCATION, TIBIOFIBULAR ARTHRODESIS WITH ALLOGRAFT, REMOVAL OF HARDWARE AND POSSIBLE RIGHT TENDON LENGTHENING  Anesthesia Type: General     Surgeon: Alberto Salvador MD     The following Testing and Time Line are REQUIRED PER ANESTHESIA     EKG READ AND SIGNED WITHIN   90 days      Thank You,   Sent by:Kateryna